# Patient Record
(demographics unavailable — no encounter records)

---

## 2024-10-10 NOTE — ASSESSMENT
[FreeTextEntry1] : Now taking insulin properly-doing great! Now with some hypoglycemia.  T2DM -Reviewed risk/complication of uncontrolled DM -Increase exercise as tolerated 5 days a week x 30 mins/day -Increase dietary efforts, low carb/low sugar -Continue to check FS twice a day and keep log, bring log to next appt -continue Current Medications: Decrease Tresiba to 42 units in PM Continue Metformin 1000 mg BID Continue Pioglitazone 15 mg daily Continue Ozempic 0.5 mg weekly Continue Humalog 10-14 units pre meal -Make follow up appt with specialist optho and podiatry  HLD -Continue simvastatin and fenofibrate,need updated lipid panel  HTN -continue enalipril, bp stable in office  Hypothyroidism -Continue LT4 88 mcg daily, need updated TFTs  Vit D Def -Continue MTV, need updated levels with labs  Fasting labs due now Daughter also my patient RTO 6 months MD

## 2024-10-10 NOTE — REVIEW OF SYSTEMS
[Fatigue] : no fatigue [Decreased Appetite] : decreased appetite [Recent Weight Gain (___ Lbs)] : no recent weight gain [Recent Weight Loss (___ Lbs)] : no recent weight loss [Visual Field Defect] : no visual field defect [Blurred Vision] : no blurred vision [Dysphagia] : no dysphagia [Neck Pain] : no neck pain [Dysphonia] : no dysphonia [Chest Pain] : no chest pain [Shortness Of Breath] : no shortness of breath [Constipation] : no constipation [Diarrhea] : no diarrhea [Polyuria] : no polyuria [Polydipsia] : no polydipsia

## 2024-10-10 NOTE — PHYSICAL EXAM
[Alert] : alert [Well Nourished] : well nourished [No Acute Distress] : no acute distress [Normal Sclera/Conjunctiva] : normal sclera/conjunctiva [Normal Hearing] : hearing was normal [Thyroid Not Enlarged] : the thyroid was not enlarged [No Accessory Muscle Use] : no accessory muscle use [Clear to Auscultation] : lungs were clear to auscultation bilaterally [Normal S1, S2] : normal S1 and S2 [Normal Rate] : heart rate was normal [No Edema] : no peripheral edema [Pedal Pulses Normal] : the pedal pulses are present [Not Tender] : non-tender [Soft] : abdomen soft [No Rash] : no rash [Right foot was examined, including] : right foot ~C was examined, including visual inspection with sensory and pulse exams [Left foot was examined, including] : left foot ~C was examined, including visual inspection with sensory and pulse exams [Normal] : normal [2+] : 2+ in the dorsalis pedis [No Tremors] : no tremors [Oriented x3] : oriented to person, place, and time [Erythema] : not erythematous [FreeTextEntry2] : toes are red in color but no pain

## 2024-10-10 NOTE — HISTORY OF PRESENT ILLNESS
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   Interval history: Now using anabela  Was in his home country [place] [facility] often this summer, back in [place] for 3 weeks  Quality: T2DM  Severity: uncontrolled  Duration: 26 years  Onset: polyuria, polydipsia  Modifying Factors  SMBG Now using anabela  Average glucose 133 GMI 6.5 GV 32.9  Very high 1% High 14% In range 83% Low 2%  Very low 0%   FS in office 94  HGA1C - need updated   Current Regimen: Tresiba 50 units in PM Metformin 1000 mg BID Jardiance 25 mg QD Ozempic 0.5 mg weekly Pioglitazone 15 mg daily  Humalog 10, 10, 14 units TID with meals  Eye Exam: Aug 2024 Foot Exam:hot sensation neuropathy, never been to podiatrist  Kidney Disease: none  Heart Disease: stent placed - on plavix , follows with cardiology  Weight: relatively stable Diet:  cultural diet  B- eggs, beans, tortilla, bread  L- skips  D-  food  Snacks- does not really snack  Dinner is largest meal of day, cannot control it, wife cooks Drinks- water, coffee  Exercise: Treadmill 15-20 mins a day or walks outside Smoking: none  HLD -Simvastatin 40 mg daily -Fenofibrate 160 mg daily  -Need updated lipid panel  HTN -On AceI -Today /62  Hypothyroidism -on LT4 88 mcg QD -Need updated TFTs  Vit D Def -Need updated levels with labs -Newly on MTV  Potassium normal on 6/2023 labs  Pt had low testosterone on labs- could be in setting of uncontrolled DM, now testosterone normal, FSH high

## 2025-02-14 NOTE — REASON FOR VISIT
[Follow - Up] : a follow-up visit [DM Type 2] : DM Type 2 [Language Line ] : provided by [employer] Line   [Time Spent: ____ minutes] : Total time spent using  services: [unfilled] minutes. The patient's primary language is not English thus required  services. [Interpreters_IDNumber] : [id number] [FreeTextEntry3] : Botswanan

## 2025-02-14 NOTE — ASSESSMENT
[FreeTextEntry1] : 75 yr old male with  1. T2DM complicated by PAD s/p stent in 2023. A1c recently improved 7.6% (down from 9.4%). Beau DWYER with some postmeal hyperglycemia otherwise good control - on multiple oral medications + insulin therapy, increase Ozempic to 1 mg weekly to help with post meal hyperglycemia and cont all other medications  - eventually try and decrease amount of insulin/pills that he is taking - on pioglitazone, no signs/sx of heart failure, no recent cardio follow up - follow up cardio and vascular - yearly dilated eye exam with ophthalmology and urine alb/cr testing  2. HLD - cont statin and fibrate 3. Hypothyroid euthyroid, cont Levothyroxine 88 mcg daily 4 vit D def - started on OTC vit d3 daily  40 min spent, new patient to me, review of chart notes/pt history and discussio of med changes via Barb[place] interpretor Detail Level: Zone

## 2025-02-14 NOTE — HISTORY OF PRESENT ILLNESS
[FreeTextEntry1] : Interval hx: first time seeing me, has been seeing NP; no complaints today  SMBG - Beau 3 avg gluc 156 variabillity 33% high 29%, target 70%, low 1%: post meal hyperglycemia, otherwise good control  T2DM x 20+ years - improved with diet (watching diet, eating lower carbs/smaller portions) and meds Current regimen:  Tresiba U100 48 units in PM Metformin 1000 mg BID Jardiance 25 mg QD Ozempic 0.5 mg weekly Pioglitazone 15 mg daily Humalog 10, 10, 14 units TID 15 min before meals  Complications:  - cardiac testing 2019 but no recent follow up - neg urine alb/cr 1/2025 - PAD s/p stent lower extremity, vascular doctor Dr Bailey, no recent follow - 8/2024 no   Hypothyroid on LT4 88 mcg daily HLD on Simvastatin 40 mg nightly, Fenofibrate 160 mg daily Vit D def - started OTC vitamin D3 2000 units daily

## 2025-02-14 NOTE — PHYSICAL EXAM
[Alert] : alert [No Acute Distress] : no acute distress [EOMI] : extra ocular movement intact [Normal Rate and Effort] : normal respiratory rate and effort [Clear to Auscultation] : lungs were clear to auscultation bilaterally [Normal S1, S2] : normal S1 and S2 [Normal Rate] : heart rate was normal [No Edema] : no peripheral edema [Not Tender] : non-tender [Soft] : abdomen soft [Normal Gait] : normal gait [Acanthosis Nigricans] : no acanthosis nigricans [Oriented x3] : oriented to person, place, and time [Normal Affect] : the affect was normal [Normal Insight/Judgement] : insight and judgment were intact [Normal Mood] : the mood was normal

## 2025-02-14 NOTE — REVIEW OF SYSTEMS
[Nocturia] : nocturia [Blurred Vision] : no blurred vision [Chest Pain] : no chest pain [Leg Claudication] : no leg claudication [Lower Ext Edema] : no lower extremity edema [Shortness Of Breath] : no shortness of breath [SOB on Exertion] : no shortness of breath on exertion [Nausea] : no nausea [Abdominal Pain] : no abdominal pain [Polyuria] : no polyuria [Pain/Numbness of Digits] : no pain/numbness of digits [Polydipsia] : no polydipsia [FreeTextEntry2] : weight stable [FreeTextEntry6] : (+) recent viral URI

## 2025-05-02 NOTE — HISTORY OF PRESENT ILLNESS
[FreeTextEntry1] : 75M with hx of HTH, DM, HLD, PVD, ex-smoker who comes to the office referred by GI due to chest pain that developed post EGD. Patient stated that the night after he had his EGD he developed left sided chest pain with radiation to the back, his pain was constant but eventually improved.  Ever since then, he has also noticed fatigue with exertion making him stop to catch his breath.  Reports chronic left leg pain on ambulation (claudication) Patient denies palpitations, RAMOS, PND, orthopnea, leg edema, no syncope.

## 2025-05-02 NOTE — REVIEW OF SYSTEMS
[Chest Discomfort] : chest discomfort [Leg Claudication] : intermittent leg claudication [Negative] : Heme/Lymph

## 2025-05-02 NOTE — ASSESSMENT
[FreeTextEntry1] : 75M with hx of HTH, DM, HLD, PVD, ex-smoker who comes to the office referred by GI due to chest pain that developed post EGD. Patient stated that the night after he had his EGD he developed left sided chest pain with radiation to the back, his pain was constant but eventually improved.  Ever since then, he has also noticed fatigue with exertion making him stop to catch his breath.  Reports chronic left leg pain on ambulation (claudication) Patient denies palpitations, RAMOS, PND, orthopnea, leg edema, no syncope.   #Angina High ASCVD risk  patient will proceed with [facility]  Echo  Immediately go to the emergency department and/or report any untoward symptoms to our office.  #claudication  instructed to follow up with vascular surgery (he has done in the past)  RTC post testing  I appreciate the opportunity of working with you in the care of RENATA SALGADO . If I may be of additional assistance, please do not hesitate to contact me.

## 2025-05-09 NOTE — REVIEW OF SYSTEMS
[Fatigue] : no fatigue [Recent Weight Gain (___ Lbs)] : no recent weight gain [Recent Weight Loss (___ Lbs)] : no recent weight loss [Visual Field Defect] : no visual field defect [Blurred Vision] : no blurred vision [Dysphagia] : no dysphagia [Neck Pain] : no neck pain [Dysphonia] : no dysphonia [Chest Pain] : chest pain [Shortness Of Breath] : no shortness of breath [Constipation] : no constipation [Abdominal Pain] : abdominal pain [Diarrhea] : no diarrhea [Polyuria] : no polyuria [Joint Pain] : joint pain [Polydipsia] : no polydipsia

## 2025-05-09 NOTE — HISTORY OF PRESENT ILLNESS
[FreeTextEntry1] : Daughter Ria here translating Cypriot  History of not taking insulin properly but now is taking properly!   Interval history: Pt had an endoscopy in April and after that has been experiencing chest pain Plans to do echo and cath in near future -with cardiology Diagnosed with h pylori- on medication to treat for next 2 weeks  Quality: T2DM  Severity: uncontrolled  Duration: 26 years  Onset: polyuria, polydipsia  Modifying Factors  SMBG Now using anabela  Average glucose 163 GMI 7.2% GV 34.0%  Very high 8% High 24% In range 67% Low 1%  Very low 0%   Has not been checking FS when anabela reads low but he does say he is symptomatic most of the time  FS in office 142  HGA1C 8.0-4/2025  Current Regimen: Tresiba 45 units in PM Metformin 1000 mg once daily Jardiance 25 mg QD Ozempic 0.5 mg weekly- never went up to 1 mg as requested by MD Pioglitazone 15 mg daily  Humalog 10 units TID with meals  Eye Exam: Aug 2024 Foot Exam:hot sensation neuropathy, never been to podiatrist  Kidney Disease: none  Heart Disease: Pt had an endoscopy in April and after that has been experiencing chest pain Plans to do echo and cath in near future -with cardiology  Weight: relatively stable Diet:  cultural diet -small portions B- often just beans L- steamed brocolli, spinach, 2 eggs D- rare rice, low carb Snacks- does not really snack  Dinner is largest meal of day, cannot control it, wife cooks Drinks- water, coffee  Exercise: Treadmill 15-20 mins a day or walks outside Smoking: none  HLD -Simvastatin 40 mg daily -Fenofibrate 160 mg daily  -Lipid panel 1/2025 acceptable  HTN -On AceI -Today /70  Hypothyroidism -on LT4 88 mcg QD -TFTs acceptable  Vit D Def -Need updated levels with labs -Newly on MTV  Potassium normal on 6/2023 labs  Pt had low testosterone on labs- could be in setting of uncontrolled DM, now testosterone normal, FSH high

## 2025-05-09 NOTE — PHYSICAL EXAM
[Alert] : alert [Well Nourished] : well nourished [No Acute Distress] : no acute distress [Normal Sclera/Conjunctiva] : normal sclera/conjunctiva [Normal Hearing] : hearing was normal [Thyroid Not Enlarged] : the thyroid was not enlarged [No Accessory Muscle Use] : no accessory muscle use [Clear to Auscultation] : lungs were clear to auscultation bilaterally [Normal S1, S2] : normal S1 and S2 [Normal Rate] : heart rate was normal [No Edema] : no peripheral edema [Pedal Pulses Normal] : the pedal pulses are present [Not Tender] : non-tender [Soft] : abdomen soft [No Rash] : no rash [Right foot was examined, including] : right foot ~C was examined, including visual inspection with sensory and pulse exams [Left foot was examined, including] : left foot ~C was examined, including visual inspection with sensory and pulse exams [Erythema] : erythematous [Normal] : normal [2+] : 2+ in the dorsalis pedis [No Tremors] : no tremors [Oriented x3] : oriented to person, place, and time [FreeTextEntry2] : toes are red in color but no pain

## 2025-05-09 NOTE — ASSESSMENT
[FreeTextEntry1] : Referral given for ortho for joint pain  T2DM -Reviewed risk/complication of uncontrolled DM -Increase exercise as tolerated 5 days a week x 30 mins/day -Increase dietary efforts, low carb/low sugar -Continue to check FS twice a day and keep log, bring log to next appt -continue Current Medications: Continue Tresiba to 45 units in PM Continue Metformin 1000 mg BID -Continue Jardiance 25 mg daily Discontinue Pioglitazone 15 mg daily while having cardiac work up  Continue Ozempic 0.5 mg weekly- he has a very decreased apeptite per daughter who doesnt think a higher dose is appropriate Continue Humalog 10 units pre meal -Make follow up appt with specialist optho and podiatry and cardiology   HLD -Continue simvastatin and fenofibrate,need updated lipid panel  HTN -continue enalipril, bp stable in office  Hypothyroidism -Continue LT4 88 mcg daily, tfts appropriate   Vit D Def -Continue MTV, need updated levels with labs  Fasting labs due now Daughter also my patient RTO 3 months NP 6 months MD

## 2025-06-25 NOTE — HISTORY OF PRESENT ILLNESS
[FreeTextEntry1] : Mr. SALGADO is a 76-year-old male referred by Dr. Dockery for consultation regarding multivessel coronary artery disease.   He initially presented to cardiology in early May 2025 after being referred by Gastroenterology due to left sided chest pain that developed post EGD. At that time, patient reported that he experienced left sided chest pain that radiated to his back which eventually improved on its own. Patient also had complaints of fatigue with exertion. He was recommended to undergo Transthoracic Echocardiogram and left heart catheterization. He underwent cardiac catheterization on 6/20/25 at [facility] with Dr. Dockery which revealed multivessel coronary artery disease.    Past Medical History is significant for HTN, DM, HLD, PVD s/p RLE stent, former smoker, hypothyroidism  Primary Cardiologist: Dr. Marshall Sanford  Today, he is accompanied by his daughter who assists in the history taking. He primarily speaks Cameroonian, daughter prefers to translate, refuses additional translation services. He reports that has been experiencing shortness of breath and fatigue. He no longer is experiencing chest pain. He is normally active prior to May 2025, when he underwent endoscopy. He has history of H. Pylori and underwent routine EGD. Patient denies chest pain, palpitations, dizziness/lightheadedness, syncope, orthopnea, lower extremity edema, weight loss/weight gain.

## 2025-06-25 NOTE — DATA REVIEWED
[FreeTextEntry1] : Independent review of imaging and independent interpretation was performed at today's visit.  6/20/25 Cardiac Catheterization at [facility] Left main artery: Mild atherosclerosis Left anterior descending artery: Diffusely diseased proximal to mid vessel 50-80% Circumflex: Mid 90%, Ostial OM1 80%, Ostial LPL 80% Right Coronary Artery: Proximal 100%, left to right collaterals  Diagnostic Conclusions: Severe multivessel disease, CABG referral, LAD OM1, LPL, RPDA  6/25/25 Transthoracic Echocardiogram at Dr. Sanford office -Images independently reviewed, report pending

## 2025-06-25 NOTE — HISTORY OF PRESENT ILLNESS
[FreeTextEntry1] : Mr. SALGADO is a 76-year-old male referred by Dr. Dockery for consultation regarding multivessel coronary artery disease.   He initially presented to cardiology in early May 2025 after being referred by Gastroenterology due to left sided chest pain that developed post EGD. At that time, patient reported that he experienced left sided chest pain that radiated to his back which eventually improved on its own. Patient also had complaints of fatigue with exertion. He was recommended to undergo Transthoracic Echocardiogram and left heart catheterization. He underwent cardiac catheterization on 6/20/25 at [facility] with Dr. Dockery which revealed multivessel coronary artery disease.    Past Medical History is significant for HTN, DM, HLD, PVD s/p RLE stent, former smoker, hypothyroidism  Primary Cardiologist: Dr. Marshall Sanford  Today, he is accompanied by his daughter who assists in the history taking. He primarily speaks Niuean, daughter prefers to translate, refuses additional translation services. He reports that has been experiencing shortness of breath and fatigue. He no longer is experiencing chest pain. He is normally active prior to May 2025, when he underwent endoscopy. He has history of H. Pylori and underwent routine EGD. Patient denies chest pain, palpitations, dizziness/lightheadedness, syncope, orthopnea, lower extremity edema, weight loss/weight gain.

## 2025-06-25 NOTE — ASSESSMENT
[FreeTextEntry1] : Mr. SALGADO is a 76-year-old male referred by Dr. Dockery for consultation regarding multivessel coronary artery disease.   He initially presented to cardiology in early May 2025 after being referred by Gastroenterology due to left sided chest pain that developed post EGD. At that time, patient reported that he experienced left sided chest pain that radiated to his back which eventually improved on its own. Patient also had complaints of fatigue with exertion. He was recommended to undergo Transthoracic Echocardiogram and left heart catheterization. He underwent cardiac catheterization on 6/20/25 at [facility] with Dr. Dockery which revealed multivessel coronary artery disease.    Past Medical History is significant for HTN, DM, HLD, PVD s/p RLE stent, former smoker, hypothyroidism  I have independently reviewed the medical records and imaging at the time of this office consultation, and discussed the following interpretations with Mr. SALGADO   Cardiac Catheterization reveals significant coronary artery disease. We discussed in great depth the diagnosis of coronary artery disease - the nature of the condition, including the narrowing or blockage of coronary arteries.   We discussed that ideal management of this would be Coronary Artery Bypass Grafting; we further discussed open surgical approach via a sternotomy. The planned procedures, hospital stays and recoveries were discussed in detail. All risks, benefits and alternatives were discussed at length with the patient.   Risks discussed include, but are not limited to infection, bleeding, myocardial infarction, cerebrovascular accident, renal failure, vascular injury requiring intervention and death. The patient fully understood and would like to proceed.  Testing: - Prior to coronary artery bypass grafting, a beta-blocker has been ordered (Metoprolol 12.5mg BID) - Prior to coronary artery bypass grafting, Aspirin 81mg daily has been ordered - Vein mapping to be completed for coronary artery bypass graft conduit evaluation - Presurgical testing to be scheduled, which will include chest x-ray, electrocardiogram and standard labs - Testing to be completed prior to surgery includes:  - pulmonary function tests  - carotid ultrasound  PREOPERATIVE CHECKLIST: (Discussed with patient) - Confirm allergies, including latex: Altace  - Confirm pacemaker: NONE  - Anticoagulation/antiplatelets noted and will be discontinued/continued: Plavix d/c 5 days prior - SGLT-2 Inhibitors (discontinued 3 days prior to surgery) or GLP-1 (discontinued 1 week prior to surgery): D/C Jardiance 3 days prior, D/C Ozempic 1 week prior  - All other supplements, NSAIDs and fish oil were discussed and will be held one week before surgery  Surgical Plan: Coronary Artery Bypass Grafting x 3 (LAD (artery) LPL (Vein) OM1 (Vein))  I, Dr. Wynne personally performed the evaluation and management (E/M) services for this new patient.  That E/M includes conducting the initial examination, assessing all conditions, and establishing the plan of care.  Today, my ACP, Nelly Eden NP was here to observe my evaluation and management services for this patient to be followed going forward.

## 2025-06-25 NOTE — REVIEW OF SYSTEMS
[Feeling Tired] : feeling tired [SOB on Exertion] : shortness of breath during exertion [Negative] : Heme/Lymph [Fever] : no fever [Chills] : no chills [Feeling Poorly] : not feeling poorly [Chest Pain] : no chest pain [Palpitations] : no palpitations [Lower Ext Edema] : no extremity edema

## 2025-06-25 NOTE — DATA REVIEWED
[FreeTextEntry1] : Independent review of imaging and independent interpretation was performed at today's visit.  6/20/25 Cardiac Catheterization at [facility] Left main artery: Mild atherosclerosis Left anterior descending artery: Diffusely diseased proximal to mid vessel 50-80% Circumflex: Mid 90%, Ostial OM1 80%, Ostial LPL 80% Right Coronary Artery: Proximal 100%, left to right collaterals  Diagnostic Conclusions: Severe multivessel disease, CABG referral, LAD OM1, LPL, RPDA  6/25/25 Transthoracic Echocardiogram at Dr. Sanford office -Images independently reviewed, report pending

## 2025-06-25 NOTE — ASSESSMENT
[FreeTextEntry1] : Mr. SALGADO is a 76-year-old male referred by Dr. Dockery for consultation regarding multivessel coronary artery disease.   He initially presented to cardiology in early May 2025 after being referred by Gastroenterology due to left sided chest pain that developed post EGD. At that time, patient reported that he experienced left sided chest pain that radiated to his back which eventually improved on its own. Patient also had complaints of fatigue with exertion. He was recommended to undergo Transthoracic Echocardiogram and left heart catheterization. He underwent cardiac catheterization on 6/20/25 at [facility] with Dr. Dockery which revealed multivessel coronary artery disease.    Past Medical History is significant for HTN, DM, HLD, PVD s/p RLE stent, former smoker, hypothyroidism  I have independently reviewed the medical records and imaging at the time of this office consultation, and discussed the following interpretations with Mr. SALGADO   Cardiac Catheterization reveals significant coronary artery disease. We discussed in great depth the diagnosis of coronary artery disease - the nature of the condition, including the narrowing or blockage of coronary arteries.   We discussed that ideal management of this would be Coronary Artery Bypass Grafting; we further discussed open surgical approach via a sternotomy. The planned procedures, hospital stays and recoveries were discussed in detail. All risks, benefits and alternatives were discussed at length with the patient.   Risks discussed include, but are not limited to infection, bleeding, myocardial infarction, cerebrovascular accident, renal failure, vascular injury requiring intervention and death. The patient fully understood and would like to proceed.  Testing: - Prior to coronary artery bypass grafting, a beta-blocker has been ordered (Metoprolol 12.5mg BID) - Prior to coronary artery bypass grafting, Aspirin 81mg daily has been ordered - Vein mapping to be completed for coronary artery bypass graft conduit evaluation - Presurgical testing to be scheduled, which will include chest x-ray, electrocardiogram and standard labs - Testing to be completed prior to surgery includes:  - pulmonary function tests  - carotid ultrasound  PREOPERATIVE CHECKLIST: (Discussed with patient) - Confirm allergies, including latex: Altace  - Confirm pacemaker: NONE  - Anticoagulation/antiplatelets noted and will be discontinued/continued: Plavix d/c 5 days prior - SGLT-2 Inhibitors (discontinued 3 days prior to surgery) or GLP-1 (discontinued 1 week prior to surgery): D/C Jardiance 3 days prior, D/C Ozempic 1 week prior  - All other supplements, NSAIDs and fish oil were discussed and will be held one week before surgery  Surgical Plan: Coronary Artery Bypass Grafting x 3 (LAD (artery) LPL (Vein) OM1 (Vein))  I, Dr. Wynne personally performed the evaluation and management (E/M) services for this new patient.  That E/M includes conducting the initial examination, assessing all conditions, and establishing the plan of care.  Today, my ACP, Nelly Eden NP was here to observe my evaluation and management services for this patient to be followed going forward.

## 2025-06-27 NOTE — ASSESSMENT
[FreeTextEntry1] : I had the pleasure of seeing Mr. SALGADO in the office today to evaluate conduit adequacy in preparation for coronary artery bypass grafting.   Briefly, this is a 76 year old male with a history of PAD s/p RLE stent, hypertension and type 2 diabetes mellitus who was found to have multivessel coronary artery disease on workup. He will be undergoing bypass grafting with Dr. Wynne and today we reviewed preparations for surgery as well as discussed the expected plan and recovery from surgery.   Ultrasound vein mapping findings as above.   I have fully reviewed and evaluated all available results. All questions were answered and concerns were addressed.   Plan: - Vein adequate for bypass - Preoperative counseling and education provided - The surgeon and ACP surgical team made aware of the findings - Patient is in full understanding and agreement with the plan going forward.

## 2025-06-27 NOTE — HISTORY OF PRESENT ILLNESS
[FreeTextEntry1] : Mr. RENATA SALGADO is a 76 year old male who has been evaluated by Dr. Wynne for coronary artery bypass grafting. Briefly, Mr. SALGADO presented with shortness of breath and fatigue and was found on workup up to have coronary artery disease.  Pertinent past medical history also includes HTN, DM, HLD, PVD s/p RLE stent, former smoker, hypothyroidism  Today, the patient presents to the office for surgical discussion and conduit evaluation. He reports feeling well. Patient denies chest pain, palpitations, shortness of breath, cough, fevers, chills, fatigue and unintentional weight loss or gain. He does feel tired at times when he exerts himself.  The patient is accompanied by his son, Harmeet, who assists with history taking and interpretation.

## 2025-06-27 NOTE — HISTORY OF PRESENT ILLNESS
Procedure: Device Interrogation Including analysis of device parameters  Current Settings: Ventricular Assist Device  Review of device function is stable      6/20/2025    12:53 AM 4/9/2025     9:34 AM 1/9/2025     9:21 AM 10/10/2024     8:52 AM 6/12/2024     9:35 AM 3/7/2024     9:32 AM 12/7/2023    11:13 AM   TXP LVAD INTERROGATIONS   Type HeartMate3         Flow 4.3         Speed 5200         PI 4.3         Power (Edwards) 3.6         Pulsatility Pulse Pulse Intermittent pulse No Pulse No Pulse Pulse Pulse             [FreeTextEntry1] : Daughter Ria here translating Burmese  Interval history: S/P Cardiac cath-----now showing multivessel CAD Needs CABG - July 21st 2025 Daughter has changed alot of the food and cooking- everyone including pt is doing much better  Quality: T2DM  Severity: uncontrolled  Duration: 26 years  Onset: polyuria, polydipsia  Modifying Factors  SMBG Now using anabela  Average glucose 139 GMI 6.6% GV 27.6%  Very high 2% High 11% In range 87% Low 0%  Very low 0%   Has not been checking FS when anabela reads low but he does say he is symptomatic most of the time  FS in office 101 -omlette with veggies   HGA1C 8.0-4/2025  Current Regimen: Tresiba 40 units in PM Metformin 1000 mg once daily Jardiance 25 mg QD Ozempic 0.5 mg weekly` Humalog 10 units TID with meals  History of not taking insulin properly but now is taking properly!   Eye Exam: Aug 2024 Foot Exam:hot sensation neuropathy, never been to podiatrist  Kidney Disease: none  Heart Disease: Pt had an endoscopy in April and after that has been experiencing chest pain Plans to do echo and cath in near future -with cardiology  Weight: relatively stable Diet:  cultural diet -small portions B- often just beans L- steamed brocolli, spinach, 2 eggs D- rare rice, low carb Snacks- does not really snack  Dinner is largest meal of day, cannot control it, wife cooks Drinks- water, coffee  Exercise: Treadmill 15-20 mins a day or walks outside Smoking: none  HLD -Simvastatin 40 mg daily -Fenofibrate 160 mg daily  -Lipid panel 1/2025 acceptable  HTN -On AceI -Today /68  Hypothyroidism -on LT4 88 mcg QD -TFTs acceptable  Vit D Def -Need updated levels with labs -Newly on MTV  Pt had low testosterone on labs in past- could be in setting of uncontrolled DM, now testosterone normal, FSH high

## 2025-06-27 NOTE — PHYSICAL EXAM
[Sclera] : the sclera and conjunctiva were normal [Neck Appearance] : the appearance of the neck was normal [] : no respiratory distress [Bowel Sounds] : normal bowel sounds [Skin Color & Pigmentation] : normal skin color and pigmentation [No Focal Deficits] : no focal deficits [Oriented To Time, Place, And Person] : oriented to person, place, and time [Impaired Insight] : insight and judgment were intact [FreeTextEntry2] : no edema

## 2025-06-27 NOTE — DATA REVIEWED
[FreeTextEntry1] : 6/20/25 Cardiac Catheterization at [facility] Left main artery: Mild atherosclerosis Left anterior descending artery: Diffusely diseased proximal to mid vessel 50-80% Circumflex: Mid 90%, Ostial OM1 80%, Ostial LPL 80% Right Coronary Artery: Proximal 100%, left to right collaterals  Diagnostic Conclusions: Severe multivessel disease, CABG referral, LAD OM1, LPL, RPDA

## 2025-06-27 NOTE — PROCEDURE
[FreeTextEntry1] : IN OFFICE ULTRASOUND - GREATER SAPHENOUS VEIN MAPPING IN PREPARATION FOR CORONARY ARTERY BYPASS GRAFTING  RIGHT Upper thigh -  3.2    mm Mid thigh -        2.9  mm Above Knee-    2.8 mm Below knee -     2.7 mm Lower leg -       2.6 mm  Additional notes:  LEFT Upper thigh -     2.8 mm Mid thigh -        2.9  mm Above Knee-    2.8 mm Below knee -     3.1 mm Lower leg -       3.0 mm  Additional notes:  All ultrasound imaging performed by Mateusz guillaume and communicated to the surgeon and advanced care providers involved with the care of this patient.

## 2025-06-27 NOTE — ASSESSMENT
[FreeTextEntry1] : PLAN TO HAVE CABG JULY 2025 PT IS AWARE TO TAKE HALF HIS BASAL NIGHT BEFORE, HOLD MEAL INSULIN WHEN NPO, 3 DAYS HOLD SGLT2, 1 WEEK HOLD GLP1  T2DM -Reviewed risk/complication of uncontrolled DM -Increase exercise as tolerated 5 days a week x 30 mins/day -Increase dietary efforts, low carb/low sugar -Continue to use anabela but should also have a working meter for when his anabela may be inaccurate. -continue Current Medications: Continue Tresiba to 40 units in PM Continue Metformin 1000 mg BID -Continue Jardiance 25 mg daily Continue Ozempic 0.5 mg weekly- he has a very decreased apeptite per daughter who doesnt think a higher dose is appropriate Continue Humalog 10 units pre meal -Make follow up appt with specialist optho and podiatry and cardiology   HLD -Continue simvastatin and fenofibrate,need updated lipid panel  HTN -continue enalipril, bp stable in office  Hypothyroidism -Continue LT4 88 mcg daily, tfts appropriate   Vit D Def -Continue MTV, need updated levels with labs  Fasting labs due now Daughter also my patient RTO 6 weeks NP (when post op), 4 months MD

## 2025-07-28 NOTE — HISTORY OF PRESENT ILLNESS
[FreeTextEntry1] : 76M, PMH HTN, DM2, HLD, PVD s/p RLE stent, former smoker, hypothyroidism, he underwent cardiac catheterization on 6/20/25 at [facility] with Dr. Dockery which revealed multivessel coronary artery disease. Patient admitted electively and underwent Coronary Artery Bypass Graft X 4 (LIMA-LAD, Vein-OM1, OM2, RPDA) on 7/21/25 with Dr. Wynne. Postoperative course significant for ABLA (expected postoperatively, started on Fe/folic acid), also hyperglycemia (followed by endo, insulin adjusted). Pt remained hemodynamically stable and discharged home with support of spouse/family, home care services and the [facility] team. Initial visit completed in home CC "I'm doing ok"

## 2025-07-28 NOTE — HISTORY OF PRESENT ILLNESS
[FreeTextEntry1] : 76M, PMH HTN, DM2, HLD, PVD s/p RLE stent, former smoker, hypothyroidism, he underwent cardiac catheterization on 6/20/25 at [facility] with Dr. Dockery which revealed multivessel coronary artery disease. Patient admitted electively and underwent Coronary Artery Bypass Graft X 4 (LIMA-LAD, Vein-OM1, OM2, RPDA) on 7/21/25 with Dr. Wynne. Postoperative course significant for ABLA (expected postoperatively, started on Fe/folic acid), also hyperglycemia (followed by endo, insulin adjusted). Pt remained hemodynamically stable and discharged home with support of spouse/family, home care services and the [facility] team. Initial visit completed in home CC "I'm doing ok"

## 2025-07-28 NOTE — ASSESSMENT
[FreeTextEntry1] : Pt recovering well at home s/p OHS. Reviewed all medications and dosages with pt understanding. Pt has all medications in home and is taking as prescribed. Pain controlled with current medication regimen. No new symptoms, issues or concerns to report at this time. Pt LLE with wrap in place and dressing CDI. Pt dtr to assist with redressing later today, pt with supplies in home and more supplies ordered by homecare.  Appears healing well. BG has been stable at this time. Low during visit reading 70. Pt had hard candy and sugar increased to 90 during visit. Pt to have lunch, rev'd parameters for administering insulin, pt with good understanding. Pt feels he had better pain control with gabapentin and Tylenol in the hospital. he feels oxycodone is making him too drowsy and not able to do anything. Rev'd with dtr and pt prescribed gabapentin 100 mg TID PRN with Tylenol. Advised to wean off oxycodone and only use for severe pain, pt with understanding.   PLAN:  -Continue current medication regimen   -Continue Post Operative Care including:      -Cleanse all incisions DAILY with mild soap and water. Avoid lotion, powders and/or creams near or on incisions       -Daily weights- report any increase of 2 lbs or more overnight to the FY or CTS team       -Incentive spirometry with cough and deep breathing several times per hour      -Ambulate as much as tolerated including outdoors if weather and safety permits      -Avoid lifting anything more than 5 lbs and avoid straining      -Maintain a low sodium, low fat, heart healthy diet, including healthy sources of protein   [facility] team will continue to follow up with pt status. NP/CCC roles explained with pt understanding, contact information provided. Pt agrees to call with any questions, issues or concerns.  Worsening symptoms reviewed with patient understanding.    FOLLOW UP APPOINTMENTS: CTS: 8/6 CARDIOLOGIST: 8/15 Dr. Juvenal SPENCER: 8/22 Micha PCP: Pt encouraged to follow up within one month of discharge

## 2025-07-28 NOTE — PHYSICAL EXAM
[Sclera] : the sclera and conjunctiva were normal [Neck Appearance] : the appearance of the neck was normal [Respiration, Rhythm And Depth] : normal respiratory rhythm and effort [Exaggerated Use Of Accessory Muscles For Inspiration] : no accessory muscle use [Auscultation Breath Sounds / Voice Sounds] : lungs were clear to auscultation bilaterally [Apical Impulse] : the apical impulse was normal [Heart Rate And Rhythm] : heart rate was normal and rhythm regular [Heart Sounds] : normal S1 and S2 [Murmurs] : no murmurs [Chest Visual Inspection Thoracic Asymmetry] : no chest asymmetry [FreeTextEntry2] : feet warm pink, B/L LE with +2 pedal edema, B/L calves soft, NT  [Bowel Sounds] : normal bowel sounds [Abdomen Soft] : soft [Abnormal Walk] : normal gait [Skin Color & Pigmentation] : normal skin color and pigmentation [Skin Turgor] : normal skin turgor [] : no rash [FreeTextEntry1] : at baseline  [Oriented To Time, Place, And Person] : oriented to person, place, and time [Impaired Insight] : insight and judgment were intact [Affect] : the affect was normal

## 2025-07-28 NOTE — REASON FOR VISIT
[Follow-Up: _____] : a [unfilled] follow-up visit [FreeTextEntry1] : [facility]- Transitional Care- [facility]

## 2025-07-28 NOTE — REVIEW OF SYSTEMS
[Heart Rate Is Slow] : the heart rate was not slow [Heart Rate Is Fast] : the heart rate was not fast [Chest Pain] : no chest pain [Palpitations] : no palpitations [Leg Claudication] : no intermittent leg claudication [Lower Ext Edema] : lower extremity edema [Negative] : Psychiatric [FreeTextEntry7] : last BM yesterday